# Patient Record
(demographics unavailable — no encounter records)

---

## 2024-12-10 NOTE — END OF VISIT
[FreeTextEntry3] : I, Dr. Olga Lidia Gomez, personally performed the evaluation and management (E/M) services for this established patient who presents today with (a) new problem(s)/exacerbation of (an) existing condition(s).  That E/M includes conducting the examination, assessing all new/exacerbated conditions, and establishing a new plan of care.  Today, Urszula Gomez ACP, was here to observe my evaluation and management services for this new problem/exacerbated condition to be followed going forward.  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  He reports improved quality of sleep, no further snoring, excessive awakenings or daytime somnolence.  Recommend mask fitting appointment as he prefers to sleep with his mouth open otherwise he reports dry mouth despite humidification use with nasal mask.  Could try fullface DreamWear mask or nasal pillows mask.  Continue PAP therapy, encouraged continued weight loss, follow-up annually or sooner if needed. 35 minutes time spent for patient education related to comorbidities and medications, medical records/labs/radiology reviews, preventative care, documentation, coordination of care.

## 2024-12-10 NOTE — HISTORY OF PRESENT ILLNESS
[Snoring] : snoring [Witnessed Apneas] : witnessed sleep apnea [Frequent Nocturnal Awakening] : frequent nocturnal awakening [Unintentional Sleep While Inactive] : unintentional sleep while inactive [Awakes Unrefreshed] : awakening unrefreshed [Awakening With Dry Mouth] : awakening with dry mouth [Recent  Weight Gain] : recent weight gain [DMS] : DMS [To Bed: ___] : ~he/she~ goes to bed at [unfilled] [Arises: ___] : arises at [unfilled] [Sleep Onset Latency: ___ minutes] : sleep onset latency of [unfilled] minutes reported [Nocturnal Awakenings: ___] : ~he/she~ typically has [unfilled] nocturnal awakenings [WASO: ___] : Wake time after sleep onset is [unfilled] [TST: ___] : Total sleep time is [unfilled] [Daytime Sleep: ___] : daytime sleep: [unfilled] [FreeTextEntry1] : 40 year old male Pmhx RY, HTN and obesity. Here today for follow up severe RY AHI 96, T-88 of 225mins. On CPAP of 12 per titration study here for compliance followup.   Pt seen today reporting benefit with nightly use of his CPAP feels well rested, less nighttime awakenings, not snoring anymore. Does not like the Nasal mask he is using. Cleaning machine and parts daily with soap and water. Using distilled water in humidifier. No longer falling asleep during the day, less naps. No DIS some DMS due to nocturia.  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

## 2024-12-10 NOTE — REVIEW OF SYSTEMS
[EDS: ESS=____] : daytime somnolence: ESS=[unfilled] [Snoring] : snoring [Witnessed Apneas] : witnessed apnea [A.M. Dry Mouth] : a.m. dry mouth [Obesity] : obesity [Nocturia] : nocturia [Difficulty Maintaining Sleep] : difficulty maintaining sleep [Negative] : Cardiovascular [Nasal Congestion] : no nasal congestion [Postnasal Drip] : no postnasal drip [Shortness Of Breath] : no shortness of breath [Difficulty Initiating Sleep] : no difficulty falling asleep

## 2024-12-10 NOTE — HISTORY OF PRESENT ILLNESS
[Snoring] : snoring [Witnessed Apneas] : witnessed sleep apnea [Frequent Nocturnal Awakening] : frequent nocturnal awakening [Unintentional Sleep While Inactive] : unintentional sleep while inactive [Awakes Unrefreshed] : awakening unrefreshed [Awakening With Dry Mouth] : awakening with dry mouth [Recent  Weight Gain] : recent weight gain [DMS] : DMS [To Bed: ___] : ~he/she~ goes to bed at [unfilled] [Arises: ___] : arises at [unfilled] [Sleep Onset Latency: ___ minutes] : sleep onset latency of [unfilled] minutes reported [Nocturnal Awakenings: ___] : ~he/she~ typically has [unfilled] nocturnal awakenings [WASO: ___] : Wake time after sleep onset is [unfilled] [TST: ___] : Total sleep time is [unfilled] [Daytime Sleep: ___] : daytime sleep: [unfilled] [FreeTextEntry1] : 40 year old male Pmhx RY, HTN and obesity. Here today for follow up severe YR AHI 96, T-88 of 225mins. On CPAP of 12 per titration study here for compliance followup.   Pt seen today reporting benefit with nightly use of his CPAP feels well rested, less nighttime awakenings, not snoring anymore. Does not like the Nasal mask he is using. Cleaning machine and parts daily with soap and water. Using distilled water in humidifier. No longer falling asleep during the day, less naps. No DIS some DMS due to nocturia.  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

## 2024-12-10 NOTE — PHYSICAL EXAM
[Normal Appearance] : normal appearance [General Appearance - In No Acute Distress] : no acute distress [Normal Conjunctiva] : the conjunctiva exhibited no abnormalities [Enlarged Base of the Tongue] : enlargement of the base of the tongue [IV] : IV [Heart Rate And Rhythm] : heart rate was normal and rhythm regular [Heart Sounds] : normal S1 and S2 [Murmurs] : no murmurs [] : no respiratory distress [Respiration, Rhythm And Depth] : normal respiratory rhythm and effort [Exaggerated Use Of Accessory Muscles For Inspiration] : no accessory muscle use [Auscultation Breath Sounds / Voice Sounds] : lungs were clear to auscultation bilaterally [Nail Clubbing] : no clubbing  or cyanosis of the fingernails [No Focal Deficits] : no focal deficits [Oriented To Time, Place, And Person] : oriented to person, place, and time [Impaired Insight] : insight and judgment were intact [Affect] : the affect was normal [Neck Appearance] : the appearance of the neck was normal [FreeTextEntry2] : no edema noted

## 2024-12-10 NOTE — ASSESSMENT
[FreeTextEntry1] : 40 year old male here today Pmhx HTN, obesity and severe RY AHI 96, T-88 of 225mins. On CPAP of 12 per titration study here for compliance followup. ESS = 7   RY  - Cont nightly use of CPAP  - compliance data reviewed 29/30 days, avg use 6 hours 45 mins with AHI  0.4, no significant leak  - Using nasal resmed Airfit N20 Large but doesn't like it - Mask fitting advised agrees to schedule   f/u annually or sooner if necessary.